# Patient Record
Sex: MALE | URBAN - METROPOLITAN AREA
[De-identification: names, ages, dates, MRNs, and addresses within clinical notes are randomized per-mention and may not be internally consistent; named-entity substitution may affect disease eponyms.]

---

## 2020-07-02 ENCOUNTER — APPOINTMENT (RX ONLY)
Dept: URBAN - METROPOLITAN AREA CLINIC 157 | Facility: CLINIC | Age: 50
Setting detail: DERMATOLOGY
End: 2020-07-02

## 2020-07-02 DIAGNOSIS — T50.905 ADVERSE EFFECT OF UNSPECIFIED DRUGS, MEDICAMENTS AND BIOLOGICAL SUBSTANCES: ICD-10-CM

## 2020-07-02 PROBLEM — L30.9 DERMATITIS, UNSPECIFIED: Status: ACTIVE | Noted: 2020-07-02

## 2020-07-02 PROCEDURE — 11104 PUNCH BX SKIN SINGLE LESION: CPT

## 2020-07-02 PROCEDURE — ? PRESCRIPTION

## 2020-07-02 PROCEDURE — ? BIOPSY BY PUNCH METHOD

## 2020-07-02 PROCEDURE — 11105 PUNCH BX SKIN EA SEP/ADDL: CPT

## 2020-07-02 RX ORDER — TRIAMCINOLONE ACETONIDE 1 MG/G
OINTMENT TOPICAL
Qty: 1 | Refills: 2 | Status: ERX | COMMUNITY
Start: 2020-07-02

## 2020-07-02 RX ADMIN — TRIAMCINOLONE ACETONIDE: 1 OINTMENT TOPICAL at 00:00

## 2020-07-02 ASSESSMENT — LOCATION SIMPLE DESCRIPTION DERM
LOCATION SIMPLE: RIGHT UPPER ARM
LOCATION SIMPLE: LEFT UPPER ARM

## 2020-07-02 ASSESSMENT — LOCATION DETAILED DESCRIPTION DERM
LOCATION DETAILED: RIGHT PROXIMAL POSTERIOR UPPER ARM
LOCATION DETAILED: LEFT PROXIMAL POSTERIOR UPPER ARM

## 2020-07-02 ASSESSMENT — LOCATION ZONE DERM: LOCATION ZONE: ARM

## 2020-07-02 NOTE — PROCEDURE: BIOPSY BY PUNCH METHOD
Detail Level: Detailed
Was A Bandage Applied: Yes
Punch Size In Mm: 4
Biopsy Type: H and E
Anesthesia Type: 1% lidocaine with epinephrine
Anesthesia Volume In Cc (Will Not Render If 0): 1
Additional Anesthesia Volume In Cc (Will Not Render If 0): 0
Hemostasis: None
Epidermal Sutures: 4-0 Prolene
Wound Care: Petrolatum
Dressing: bandage
Suture Removal: 14 days
Patient Will Remove Sutures At Home?: No
Lab: [id number]
Lab Facility: [id number]
Consent: Verbal consent was obtained and risks were reviewed including but not limited to scarring, infection, bleeding, scabbing, incomplete removal, nerve damage and allergy to anesthesia.
Post-Care Instructions: I reviewed with the patient in detail post-care instructions. Patient is to keep the biopsy site covered overnight without getting the area wet, and then apply petrolatum daily until healed. Patient may apply hydrogen peroxide soaks to remove any crusting.
Home Suture Removal Text: Patient was provided a home suture removal kit and will remove their sutures at home.  If they have any questions or difficulties they will call the office.
Notification Instructions: Patient will be notified of biopsy results. However, patient instructed to call the office if not contacted within 2 weeks.
Billing Type: Third-Party Bill
Information: Selecting Yes will display possible errors in your note based on the variables you have selected. This validation is only offered as a suggestion for you. PLEASE NOTE THAT THE VALIDATION TEXT WILL BE REMOVED WHEN YOU FINALIZE YOUR NOTE. IF YOU WANT TO FAX A PRELIMINARY NOTE YOU WILL NEED TO TOGGLE THIS TO 'NO' IF YOU DO NOT WANT IT IN YOUR FAXED NOTE.
Lab: [id number]
Lab Facility: [id number]
Home Suture Removal Text: Patient was provided a home suture removal kit and will remove their sutures at home.  If they have any questions or difficulties they will call the office.
Billing Type: Third-Party Bill

## 2020-07-16 ENCOUNTER — APPOINTMENT (RX ONLY)
Dept: URBAN - METROPOLITAN AREA CLINIC 157 | Facility: CLINIC | Age: 50
Setting detail: DERMATOLOGY
End: 2020-07-16

## 2020-07-16 VITALS — WEIGHT: 200 LBS

## 2020-07-16 DIAGNOSIS — T07XXXA INSECT BITE, NONVENOMOUS, OF OTHER, MULTIPLE, AND UNSPECIFIED SITES, WITHOUT MENTION OF INFECTION: ICD-10-CM

## 2020-07-16 DIAGNOSIS — Z48.02 ENCOUNTER FOR REMOVAL OF SUTURES: ICD-10-CM

## 2020-07-16 PROBLEM — S20.469A INSECT BITE (NONVENOMOUS) OF UNSPECIFIED BACK WALL OF THORAX, INITIAL ENCOUNTER: Status: ACTIVE | Noted: 2020-07-16

## 2020-07-16 PROCEDURE — ? SUTURE REMOVAL (GLOBAL PERIOD)

## 2020-07-16 PROCEDURE — ? COUNSELING

## 2020-07-16 PROCEDURE — 99212 OFFICE O/P EST SF 10 MIN: CPT

## 2020-07-16 PROCEDURE — ? PRESCRIPTION

## 2020-07-16 PROCEDURE — ? TREATMENT REGIMEN

## 2020-07-16 RX ORDER — IVERMECTIN 3 MG/1
TABLET ORAL
Qty: 12 | Refills: 0 | Status: ERX | COMMUNITY
Start: 2020-07-16

## 2020-07-16 RX ORDER — PERMETHRIN 50 MG/G
CREAM TOPICAL
Qty: 1 | Refills: 1 | Status: ERX | COMMUNITY
Start: 2020-07-16

## 2020-07-16 RX ADMIN — IVERMECTIN: 3 TABLET ORAL at 00:00

## 2020-07-16 RX ADMIN — PERMETHRIN: 50 CREAM TOPICAL at 00:00

## 2020-07-16 ASSESSMENT — LOCATION SIMPLE DESCRIPTION DERM
LOCATION SIMPLE: LEFT UPPER BACK
LOCATION SIMPLE: LEFT UPPER ARM
LOCATION SIMPLE: RIGHT UPPER ARM

## 2020-07-16 ASSESSMENT — LOCATION DETAILED DESCRIPTION DERM
LOCATION DETAILED: LEFT PROXIMAL POSTERIOR UPPER ARM
LOCATION DETAILED: RIGHT PROXIMAL POSTERIOR UPPER ARM
LOCATION DETAILED: LEFT MID-UPPER BACK

## 2020-07-16 ASSESSMENT — LOCATION ZONE DERM
LOCATION ZONE: TRUNK
LOCATION ZONE: ARM

## 2020-07-16 NOTE — PROCEDURE: SUTURE REMOVAL (GLOBAL PERIOD)
Detail Level: Detailed
Add 13856 Cpt? (Important Note: In 2017 The Use Of 78007 Is Being Tracked By Cms To Determine Future Global Period Reimbursement For Global Periods): no

## 2020-07-16 NOTE — PROCEDURE: TREATMENT REGIMEN
Initiate Treatment: Ivermectin: take 6 pills by mouth once, then take 6 more pills seven days later\\nPermethrin: apply topically from neck down once at bedtime, repeat 7 days later
Plan: Instructed patient to take 6 ivermectin pills at night, then after shower apply permethrin cream from neck down, and cover with a long sleeve shirt. Shower in the morning and remove all bedsheets and blankets along with clothes and wash in hot water. Repeat all in 7 days
Detail Level: Zone

## 2020-10-07 RX ORDER — PERMETHRIN 50 MG/G
CREAM TOPICAL
Qty: 1 | Refills: 1 | Status: ERX